# Patient Record
Sex: MALE | Race: OTHER | URBAN - METROPOLITAN AREA
[De-identification: names, ages, dates, MRNs, and addresses within clinical notes are randomized per-mention and may not be internally consistent; named-entity substitution may affect disease eponyms.]

---

## 2017-08-02 ENCOUNTER — EMERGENCY (EMERGENCY)
Facility: HOSPITAL | Age: 27
LOS: 1 days | Discharge: HOME | End: 2017-08-02
Attending: EMERGENCY MEDICINE | Admitting: EMERGENCY MEDICINE
Payer: COMMERCIAL

## 2017-08-02 VITALS
DIASTOLIC BLOOD PRESSURE: 82 MMHG | SYSTOLIC BLOOD PRESSURE: 122 MMHG | RESPIRATION RATE: 18 BRPM | TEMPERATURE: 98 F | OXYGEN SATURATION: 97 % | HEART RATE: 97 BPM

## 2017-08-02 PROBLEM — Z00.00 ENCOUNTER FOR PREVENTIVE HEALTH EXAMINATION: Status: ACTIVE | Noted: 2017-08-02

## 2017-08-02 PROCEDURE — 16020 DRESS/DEBRID P-THICK BURN S: CPT

## 2017-08-02 PROCEDURE — 99285 EMERGENCY DEPT VISIT HI MDM: CPT | Mod: 25

## 2017-08-02 PROCEDURE — 99283 EMERGENCY DEPT VISIT LOW MDM: CPT | Mod: 25

## 2017-08-02 NOTE — ED ADULT NURSE NOTE - OBJECTIVE STATEMENT
[id number] 27 yr old male c/o hot water burn to right foot at work today in kitchen. +erythema dorsum of right foot near right 5th toe. ambulatory. Ice pack in place. Moderate pain at present 5/10. Dr jaeger pt. Bacitracin ointment dressing applied by

## 2017-08-02 NOTE — ED PROVIDER NOTE - OBJECTIVE STATEMENT
26 yo male presents to the ER for evaluation of partial thickness to top of right foot. 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 I immediately took off my shoes and sock and was told to come to the ER for evaluation". TDAP utd as per pt. Superficial and partial thickness burns noted to 6x2 cm area of dorsum of foot 28 yo male presents to the ER for evaluation of partial thickness to top of right foot. 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 I immediately took off my shoes and sock and was told to come to the ER for evaluation". TDAP utd as per pt. Superficial and partial thickness burns noted to 6x2 cm area of dorsum of foot       Attending note. Patient was seen in fast track room #6. Agree with the above. Patient was at work at [employer] and spilled hot water through his mesh shoe causing a burn on the dorsal lateral aspect of his right foot. Patient states his tetanus is up-to-date.

## 2017-08-02 NOTE — ED PROVIDER NOTE - MEDICAL DECISION MAKING DETAILS
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  bacitracin applied s/p burn to right anterior foot after hot water spilled while working in kitchen- tdap utd.  bacitracin applied       Attending note-second-degree burn on the distal lateral right foot. Antibiotic ointment, analgesics area of tenderness is up-to-date.

## 2017-08-02 NOTE — ED PROVIDER NOTE - CARE PLAN
Principal Discharge DX:	Burn by hot liquid  Instructions for follow-up, activity and diet:	Apply bacitracin to affected area 2x a day for 5 days.   Take motrin for pain as needed 600 mg every 8 hrs- Take with food  Return to the ER for any concerns.

## 2017-08-02 NOTE — ED PROVIDER NOTE - PHYSICAL EXAMINATION
Attending note. Patient is alert and in no acute distress. Examination of the patient's right foot reveals a small area of erythema with a central area of blistering approximately 1/2 cm diameter on the dorsal lateral distal right foot. Sensation is normal. Blisters intact.

## 2017-08-02 NOTE — ED PROVIDER NOTE - PLAN OF CARE
Apply bacitracin to affected area 2x a day for 5 days.   Take motrin for pain as needed 600 mg every 8 hrs- Take with food  Return to the ER for any concerns.

## 2021-03-18 ENCOUNTER — APPOINTMENT (OUTPATIENT)
Age: 31
End: 2021-03-18
Payer: COMMERCIAL

## 2021-03-18 PROCEDURE — 0001A: CPT
